# Patient Record
Sex: FEMALE | Race: BLACK OR AFRICAN AMERICAN | Employment: FULL TIME | URBAN - NONMETROPOLITAN AREA
[De-identification: names, ages, dates, MRNs, and addresses within clinical notes are randomized per-mention and may not be internally consistent; named-entity substitution may affect disease eponyms.]

---

## 2021-09-07 ENCOUNTER — HOSPITAL ENCOUNTER (EMERGENCY)
Age: 21
Discharge: HOME | End: 2021-09-07
Attending: EMERGENCY MEDICINE | Admitting: HOSPITALIST
Payer: COMMERCIAL

## 2021-09-07 ENCOUNTER — APPOINTMENT (OUTPATIENT)
Dept: GENERAL RADIOLOGY | Age: 21
End: 2021-09-07
Attending: EMERGENCY MEDICINE
Payer: COMMERCIAL

## 2021-09-07 VITALS
RESPIRATION RATE: 20 BRPM | TEMPERATURE: 98.5 F | DIASTOLIC BLOOD PRESSURE: 86 MMHG | HEART RATE: 67 BPM | OXYGEN SATURATION: 98 % | SYSTOLIC BLOOD PRESSURE: 135 MMHG
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
## 2021-09-07 DIAGNOSIS — S80.02XA CONTUSION OF LEFT KNEE, INITIAL ENCOUNTER: ICD-10-CM

## 2021-09-07 DIAGNOSIS — S20.219A CONTUSION OF CHEST WALL, UNSPECIFIED LATERALITY, INITIAL ENCOUNTER: Primary | ICD-10-CM

## 2021-09-07 PROBLEM — J81.1 PULMONARY EDEMA: Status: ACTIVE | Noted: 2021-09-07

## 2021-09-07 PROCEDURE — 99283 EMERGENCY DEPT VISIT LOW MDM: CPT

## 2021-09-07 PROCEDURE — 71045 X-RAY EXAM CHEST 1 VIEW: CPT

## 2021-09-07 PROCEDURE — 73560 X-RAY EXAM OF KNEE 1 OR 2: CPT

## 2021-09-07 PROCEDURE — 99218 HC RM OBSERVATION: CPT

## 2021-09-07 PROCEDURE — 74011250637 HC RX REV CODE- 250/637: Performed by: EMERGENCY MEDICINE

## 2021-09-07 RX ORDER — SODIUM CHLORIDE 0.9 % (FLUSH) 0.9 %
5-40 SYRINGE (ML) INJECTION AS NEEDED
Status: DISCONTINUED | OUTPATIENT
Start: 2021-09-07 | End: 2021-09-07

## 2021-09-07 RX ORDER — ACETAMINOPHEN 325 MG/1
650 TABLET ORAL
Status: DISCONTINUED | OUTPATIENT
Start: 2021-09-07 | End: 2021-09-07

## 2021-09-07 RX ORDER — ACETAMINOPHEN 650 MG/1
650 SUPPOSITORY RECTAL
Status: DISCONTINUED | OUTPATIENT
Start: 2021-09-07 | End: 2021-09-07

## 2021-09-07 RX ORDER — POLYETHYLENE GLYCOL 3350 17 G/17G
17 POWDER, FOR SOLUTION ORAL DAILY PRN
Status: DISCONTINUED | OUTPATIENT
Start: 2021-09-07 | End: 2021-09-07

## 2021-09-07 RX ORDER — SODIUM CHLORIDE 0.9 % (FLUSH) 0.9 %
5-40 SYRINGE (ML) INJECTION EVERY 8 HOURS
Status: DISCONTINUED | OUTPATIENT
Start: 2021-09-07 | End: 2021-09-07

## 2021-09-07 RX ORDER — ENOXAPARIN SODIUM 100 MG/ML
40 INJECTION SUBCUTANEOUS DAILY
Status: DISCONTINUED | OUTPATIENT
Start: 2021-09-08 | End: 2021-09-07

## 2021-09-07 RX ORDER — IBUPROFEN 600 MG/1
600 TABLET ORAL
Qty: 20 TABLET | Refills: 0 | Status: SHIPPED | OUTPATIENT
Start: 2021-09-07 | End: 2021-09-08 | Stop reason: SDUPTHER

## 2021-09-07 RX ORDER — HYDROCODONE BITARTRATE AND ACETAMINOPHEN 7.5; 325 MG/1; MG/1
1 TABLET ORAL ONCE
Status: COMPLETED | OUTPATIENT
Start: 2021-09-07 | End: 2021-09-07

## 2021-09-07 RX ORDER — ONDANSETRON 2 MG/ML
4 INJECTION INTRAMUSCULAR; INTRAVENOUS
Status: DISCONTINUED | OUTPATIENT
Start: 2021-09-07 | End: 2021-09-07

## 2021-09-07 RX ORDER — ONDANSETRON 4 MG/1
4 TABLET, ORALLY DISINTEGRATING ORAL
Status: DISCONTINUED | OUTPATIENT
Start: 2021-09-07 | End: 2021-09-07

## 2021-09-07 RX ADMIN — HYDROCODONE BITARTRATE AND ACETAMINOPHEN 1 TABLET: 7.5; 325 TABLET ORAL at 19:06

## 2021-09-07 NOTE — Clinical Note
Patient Class[de-identified] OBSERVATION [104]  Type of Bed: Remote Telemetry [29]  Cardiac Monitoring Required?: Yes  Reason for Observation: renal dialysis  Admitting Diagnosis: Pulmonary edema [026327]  Admitting Physician: Alberto Farr [[id number]]  Meraryin g Physician: Alberto Farr [[id number]]
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
## 2021-09-07 NOTE — ED NOTES
Pt was given and educated on discharge instructions, understanding was verbalized. Pt was taken out of ED in wheelchair by staff to an awaiting vehicle.

## 2021-09-07 NOTE — LETTER
[address]  [facility] EMERGENCY DEPT  [address] [id number]  [phone]    Work/School Note    Date: 9/7/2021    To Whom It May concern:    Milan Champion was seen and treated today in the emergency room by the following provider(s):  Attending Provider: Malick Osman MD.      Milan Champion is excused from work/school on 09/07/21 and 09/08/21. She is medically clear to return to work/school on 9/9/2021.        Sincerely,          Jamaal Crawford MD

## 2021-09-08 RX ORDER — IBUPROFEN 600 MG/1
600 TABLET ORAL
Qty: 20 TABLET | Refills: 0 | Status: SHIPPED | OUTPATIENT
Start: 2021-09-08

## 2021-09-09 ENCOUNTER — APPOINTMENT (OUTPATIENT)
Dept: GENERAL RADIOLOGY | Age: 21
End: 2021-09-09
Attending: EMERGENCY MEDICINE
Payer: COMMERCIAL

## 2021-09-09 ENCOUNTER — HOSPITAL ENCOUNTER (EMERGENCY)
Age: 21
Discharge: HOME | End: 2021-09-09
Attending: EMERGENCY MEDICINE
Payer: COMMERCIAL

## 2021-09-09 VITALS
SYSTOLIC BLOOD PRESSURE: 149 MMHG | RESPIRATION RATE: 18 BRPM | DIASTOLIC BLOOD PRESSURE: 91 MMHG | HEART RATE: 75 BPM | TEMPERATURE: 98.9 F | HEIGHT: 66 IN | OXYGEN SATURATION: 99 % | BODY MASS INDEX: 36.16 KG/M2 | WEIGHT: 225 LBS

## 2021-09-09 DIAGNOSIS — S92.422A CLOSED DISPLACED FRACTURE OF DISTAL PHALANX OF LEFT GREAT TOE, INITIAL ENCOUNTER: Primary | ICD-10-CM

## 2021-09-09 PROCEDURE — 73660 X-RAY EXAM OF TOE(S): CPT

## 2021-09-09 PROCEDURE — 99282 EMERGENCY DEPT VISIT SF MDM: CPT

## 2021-09-09 NOTE — ED NOTES
9/9/2021      RE: Isabella Champion      To Whom it May Concern: This is to certify that Evert Ann may return to work on 9/10/2021. Please feel free to contact my office if you have any questions or concerns. Thank you for your assistance in this matter.

## 2021-09-09 NOTE — ED NOTES
Pt given discharge and follow up instructions. Pt given education on pain management. Pt advised to follow up with Ortho. No further questions at this time.
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
## 2021-09-15 ENCOUNTER — OFFICE VISIT (OUTPATIENT)
Dept: ORTHOPEDIC SURGERY | Age: 21
End: 2021-09-15
Payer: COMMERCIAL

## 2021-09-15 VITALS — HEIGHT: 67 IN | BODY MASS INDEX: 40.02 KG/M2 | WEIGHT: 255 LBS

## 2021-09-15 DIAGNOSIS — S92.425A CLOSED NONDISPLACED FRACTURE OF DISTAL PHALANX OF LEFT GREAT TOE, INITIAL ENCOUNTER: ICD-10-CM

## 2021-09-15 DIAGNOSIS — M79.672 LEFT FOOT PAIN: Primary | ICD-10-CM

## 2021-09-15 PROCEDURE — 28490 TREAT BIG TOE FRACTURE: CPT | Performed by: ORTHOPAEDIC SURGERY

## 2021-09-15 PROCEDURE — 99203 OFFICE O/P NEW LOW 30 MIN: CPT | Performed by: ORTHOPAEDIC SURGERY

## 2021-09-15 NOTE — LETTER
9/17/2021    Patient: Markie Morrison   YOB: 2000   Date of Visit: 9/15/2021     Max Flower MD  [address] [facility] [address]  Via Fax: [phone]    Dear Max Flower MD,      Thank you for referring Ms. Issa Champion to [address] [place] [address] for evaluation. My notes for this consultation are attached. If you have questions, please do not hesitate to call me. I look forward to following your patient along with you.       Sincerely,    Norma Suarez MD

## 2021-09-15 NOTE — PROGRESS NOTES
Name: Sana Hansen    : 2000     Service Dept: [address] [place] [address] [facility]    Patient's Pharmacies:    [address] [facility], [address], [place]  [place] [address]  Phone: [phone] Fax: [phone] [address] [address]  Phone: [phone] Fax: [phone]       Chief Complaint   Patient presents with    Toe Pain        Visit Vitals  Ht 5' 7\" (1.702 m)   Wt 255 lb (115.7 kg)   BMI 39.94 kg/m²      No Known Allergies   Current Outpatient Medications   Medication Sig Dispense Refill    ibuprofen (MOTRIN) 600 mg tablet Take 1 Tablet by mouth every six (6) hours as needed for Pain. 20 Tablet 0      Patient Active Problem List   Diagnosis Code    Pulmonary edema J81.1      History reviewed. No pertinent family history. Social History     Socioeconomic History    Marital status: SINGLE     Spouse name: Not on file    Number of children: Not on file    Years of education: Not on file    Highest education level: Not on file   Tobacco Use    Smoking status: Never Smoker    Smokeless tobacco: Never Used   Substance and Sexual Activity    Alcohol use: Yes     Comment: socially     Social Determinants of Health     Financial Resource Strain:     Difficulty of Paying Living Expenses:    Food Insecurity:     Worried About Running Out of Food in the Last Year:     [address] in the Last Year:    Transportation Needs:     Lack of Transportation (Medical):      Lack of Transportation (Non-Medical):    Physical Activity:     Days of Exercise per Week:     Minutes of Exercise per Session:    Stress:     Feeling of Stress :    Social Connections:     Frequency of Communication with Friends and Family:     Frequency of Social Gatherings with Friends and Family:     Attends Sikh Services:     Active Member of Clubs or Organizations:     Attends Club or Organization Meetings:     Marital Status:       History reviewed. No pertinent surgical history. History reviewed. No pertinent past medical history. I have reviewed and agree with [address] and ROS and intake form in chart and the record furthermore I have reviewed prior medical record(s) regarding this patients care during this appointment. Review of Systems:   Patient is a pleasant appearing individual, appropriately dressed, well hydrated, well nourished, who is alert, appropriately oriented for age, and in no acute distress with a normal gait and normal affect who does not appear to be in any significant pain. Physical Exam:  Left foot - grossly neurovascularly intact, good cap refill, positive tenderness to palpation in the area of the fracture(s) , positive soft tissue swelling, decreased range of motion, skin intact. Right foot- Grossly neurovascularly intact, good cap refill, full range of motion, no weakness, no swelling, no point tenderness, no skin lesions. Encounter Diagnoses     ICD-10-CM ICD-9-CM   1. Left foot pain  M79.672 729.5   2. Closed nondisplaced fracture of distal phalanx of left great toe, initial encounter  S92.425A 826.0       HPI:  The patient is here with a chief complaint of left toe pain, dull, throbbing pain. It has been the same. Pain is 3/10. X-rays of the left toe are positive for distal phalanx fracture of the toe, in good alignment on AP, lateral and oblique. Assessment/Plan:  Plan at this point, my recommendation would be for Cam boot, readjustment, fracture care treatment, and we will see the patient back in about 4 weeks to repeat x-rays of the left toe AP, lateral and oblique and go from there. As part of continued conservative pain management options the patient was advised to utilize Tylenol or OTC NSAIDS as long as it is not medically contraindicated. Return to Office:    Follow-up and Dispositions · Return in about 4 weeks (around 10/13/2021) for w/ xrays. Scribed by Pam Mendoza LPN as dictated by [facility] DIONNE Prater MD.  Documentation True and Accepted Jeff Prater MD

## 2021-09-15 NOTE — PATIENT INSTRUCTIONS
Foot Pain: Care Instructions  Your Care Instructions     Foot injuries that cause pain and swelling are fairly common. Almost all sports or home repair projects can cause a misstep that ends up as foot pain. Normal wear and tear, especially as you get older, also can cause foot pain. Most minor foot injuries will heal on their own, and home treatment is usually all you need to do. If you have a severe injury, you may need tests and treatment. Follow-up care is a key part of your treatment and safety. Be sure to make and go to all appointments, and call your doctor if you are having problems. It's also a good idea to know your test results and keep a list of the medicines you take. How can you care for yourself at home? · Take pain medicines exactly as directed. ? If the doctor gave you a prescription medicine for pain, take it as prescribed. ? If you are not taking a prescription pain medicine, ask your doctor if you can take an over-the-counter medicine. · Rest and protect your foot. Take a break from any activity that may cause pain. · Put ice or a cold pack on your foot for 10 to 20 minutes at a time. Put a thin cloth between the ice and your skin. · Prop up the sore foot on a pillow when you ice it or anytime you sit or lie down during the next 3 days. Try to keep it above the level of your heart. This will help reduce swelling. · Your doctor may recommend that you wrap your foot with an elastic bandage. Keep your foot wrapped for as long as your doctor advises. · If your doctor recommends crutches, use them as directed. · Wear roomy footwear. · As soon as pain and swelling end, begin gentle exercises of your foot. Your doctor can tell you which exercises will help. When should you call for help? Call 911 anytime you think you may need emergency care. For example, call if:    · Your foot turns pale, white, blue, or cold.    Call your doctor now or seek immediate medical care if:    · You cannot move or stand on your foot.     · Your foot looks twisted or out of its normal position.     · Your foot is not stable when you step down.     · You have signs of infection, such as:  ? Increased pain, swelling, warmth, or redness. ? Red streaks leading from the sore area. ? Pus draining from a place on your foot. ? A fever.     · Your foot is numb or tingly. Watch closely for changes in your health, and be sure to contact your doctor if:    · You do not get better as expected.     · You have bruises from an injury that last longer than 2 weeks. Where can you learn more? Go to http://www.perez.com/  Enter D999 in the search box to learn more about \"Foot Pain: Care Instructions. \"  Current as of: July 1, 2021               Content Version: 13.0  © 7753-1439 [employer]. Care instructions adapted under license by [employer] (which disclaims liability or warranty for this information). If you have questions about a medical condition or this instruction, always ask your healthcare professional. [address] any warranty or liability for your use of this information.

## 2021-10-11 DIAGNOSIS — M79.675 GREAT TOE PAIN, LEFT: Primary | ICD-10-CM

## 2021-10-13 ENCOUNTER — OFFICE VISIT (OUTPATIENT)
Dept: ORTHOPEDIC SURGERY | Age: 21
End: 2021-10-13
Payer: COMMERCIAL

## 2021-10-13 DIAGNOSIS — M25.475 EFFUSION OF LEFT FOOT: Primary | ICD-10-CM

## 2021-10-13 PROCEDURE — 99024 POSTOP FOLLOW-UP VISIT: CPT | Performed by: ORTHOPAEDIC SURGERY

## 2021-10-13 NOTE — LETTER
10/14/2021    Patient: Dontrell Plaza   YOB: 2000   Date of Visit: 10/13/2021     Tyrone Oseguera MD  [address] [facility] [address]  Via Fax: [phone]    Dear Tyrone Oseguera MD,      Thank you for referring Ms. Duy Champion to [address] [place] [address] for evaluation. My notes for this consultation are attached. If you have questions, please do not hesitate to call me. I look forward to following your patient along with you.       Sincerely,    Mary Lombardi MD

## 2021-10-13 NOTE — PROGRESS NOTES
Name: Ovidio Camarena    : 2000     Service Dept: [address] [place] [address] [place] [facility]    Patient's Pharmacies:    [id number] - [place], [address], [place]  Chiki Mcneill [place] [address]  Phone: [phone] Fax: [phone] [address] [address]  Phone: [phone] Fax: [phone]       Chief Complaint   Patient presents with    Foot Pain        There were no vitals taken for this visit. No Known Allergies   Current Outpatient Medications   Medication Sig Dispense Refill    ibuprofen (MOTRIN) 600 mg tablet Take 1 Tablet by mouth every six (6) hours as needed for Pain. 20 Tablet 0      Patient Active Problem List   Diagnosis Code    Pulmonary edema J81.1      History reviewed. No pertinent family history. Social History     Socioeconomic History    Marital status: SINGLE     Spouse name: Not on file    Number of children: Not on file    Years of education: Not on file    Highest education level: Not on file   Tobacco Use    Smoking status: Never Smoker    Smokeless tobacco: Never Used   Substance and Sexual Activity    Alcohol use: Yes     Comment: socially     Social Determinants of Health     Financial Resource Strain:     Difficulty of Paying Living Expenses:    Food Insecurity:     Worried About Running Out of Food in the Last Year:     [address] in the Last Year:    Transportation Needs:     Lack of Transportation (Medical):      Lack of Transportation (Non-Medical):    Physical Activity:     Days of Exercise per Week:     Minutes of Exercise per Session:    Stress:     Feeling of Stress :    Social Connections:     Frequency of Communication with Friends and Family:     Frequency of Social Gatherings with Friends and Family:     Attends M[employer] Services:     Active Member of Clubs or Organizations:     Attends Club or Organization Meetings:     Marital Status:       History reviewed. No pertinent surgical history. History reviewed. No pertinent past medical history. I have reviewed and agree with [address] and ROS and intake form in chart and the record furthermore I have reviewed prior medical record(s) regarding this patients care during this appointment. Review of Systems:   Patient is a pleasant appearing individual, appropriately dressed, well hydrated, well nourished, who is alert, appropriately oriented for age, and in no acute distress with a normal gait and normal affect who does not appear to be in any significant pain. Physical Exam:  Left foot- Grossly neurovascularly intact, good cap refill, full range of motion, no weakness, no swelling, no point tenderness, no skin lesions. Right foot - Grossly neurovascularly intact, good cap refill, full range of motion, no weakness, no swelling, no point tenderness, no skin lesions. Encounter Diagnoses     ICD-10-CM ICD-9-CM   1. Effusion of left foot  M25.475 719.07       HPI:  The patient is here with a chief complaint of left toe pain. Diagnosed with distal phalanx fracture. X-rays revealed good alignment of the fracture with no evidence of any displacement. Assessment/Plan:  Plan at this point, activities as tolerated, weightbearing started, no restrictions. We will see the patient back as needed. As part of continued conservative pain management options the patient was advised to utilize Tylenol or OTC NSAIDS as long as it is not medically contraindicated. Return to Office: Follow-up and Dispositions    · Return if symptoms worsen or fail to improve. Scribed by Lynne Rios LPN as dictated by [facility] [place] DIONNE Maxwell MD.  Documentation True and Accepted Jeff Maxwell MD

## 2021-10-13 NOTE — PATIENT INSTRUCTIONS
Foot Pain: Care Instructions  Your Care Instructions     Foot injuries that cause pain and swelling are fairly common. Almost all sports or home repair projects can cause a misstep that ends up as foot pain. Normal wear and tear, especially as you get older, also can cause foot pain. Most minor foot injuries will heal on their own, and home treatment is usually all you need to do. If you have a severe injury, you may need tests and treatment. Follow-up care is a key part of your treatment and safety. Be sure to make and go to all appointments, and call your doctor if you are having problems. It's also a good idea to know your test results and keep a list of the medicines you take. How can you care for yourself at home? · Take pain medicines exactly as directed. ? If the doctor gave you a prescription medicine for pain, take it as prescribed. ? If you are not taking a prescription pain medicine, ask your doctor if you can take an over-the-counter medicine. · Rest and protect your foot. Take a break from any activity that may cause pain. · Put ice or a cold pack on your foot for 10 to 20 minutes at a time. Put a thin cloth between the ice and your skin. · Prop up the sore foot on a pillow when you ice it or anytime you sit or lie down during the next 3 days. Try to keep it above the level of your heart. This will help reduce swelling. · Your doctor may recommend that you wrap your foot with an elastic bandage. Keep your foot wrapped for as long as your doctor advises. · If your doctor recommends crutches, use them as directed. · Wear roomy footwear. · As soon as pain and swelling end, begin gentle exercises of your foot. Your doctor can tell you which exercises will help. When should you call for help? Call 911 anytime you think you may need emergency care. For example, call if:    · Your foot turns pale, white, blue, or cold.    Call your doctor now or seek immediate medical care if:    · You cannot move or stand on your foot.     · Your foot looks twisted or out of its normal position.     · Your foot is not stable when you step down.     · You have signs of infection, such as:  ? Increased pain, swelling, warmth, or redness. ? Red streaks leading from the sore area. ? Pus draining from a place on your foot. ? A fever.     · Your foot is numb or tingly. Watch closely for changes in your health, and be sure to contact your doctor if:    · You do not get better as expected.     · You have bruises from an injury that last longer than 2 weeks. Where can you learn more? Go to http://www.perez.com/  Enter D999 in the search box to learn more about \"Foot Pain: Care Instructions. \"  Current as of: July 1, 2021               Content Version: 13.0  © 7214-9063 [employer], [employer]. Care instructions adapted under license by [employer] (which disclaims liability or warranty for this information). If you have questions about a medical condition or this instruction, always ask your healthcare professional. [address] any warranty or liability for your use of this information.

## 2022-02-06 ENCOUNTER — HOSPITAL ENCOUNTER (EMERGENCY)
Age: 22
Discharge: HOME | End: 2022-02-06
Attending: EMERGENCY MEDICINE
Payer: COMMERCIAL

## 2022-02-06 VITALS
BODY MASS INDEX: 36.41 KG/M2 | RESPIRATION RATE: 18 BRPM | HEART RATE: 64 BPM | OXYGEN SATURATION: 99 % | HEIGHT: 67 IN | TEMPERATURE: 98.5 F | DIASTOLIC BLOOD PRESSURE: 86 MMHG | SYSTOLIC BLOOD PRESSURE: 152 MMHG | WEIGHT: 232 LBS

## 2022-02-06 DIAGNOSIS — A05.9 FOOD POISONING: Primary | ICD-10-CM

## 2022-02-06 PROCEDURE — 74011250637 HC RX REV CODE- 250/637: Performed by: EMERGENCY MEDICINE

## 2022-02-06 PROCEDURE — 99283 EMERGENCY DEPT VISIT LOW MDM: CPT

## 2022-02-06 RX ORDER — MAG HYDROX/ALUMINUM HYD/SIMETH 200-200-20
30 SUSPENSION, ORAL (FINAL DOSE FORM) ORAL
Status: DISCONTINUED | OUTPATIENT
Start: 2022-02-06 | End: 2022-02-06 | Stop reason: HOSPADM

## 2022-02-06 RX ADMIN — MAGNESIUM HYDROXIDE/ALUMINUM HYDROXICE/SIMETHICONE 30 ML: 120; 1200; 1200 SUSPENSION ORAL at 15:59

## 2022-02-06 NOTE — Clinical Note
[address] [place]  [facility] EMERGENCY DEPT  [address] [id number]  [phone]    Work/School Note    Date: 2/6/2022    To Whom It May concern:      Matteo Champion was seen and treated today in the emergency room by the following provider(s):  Attending Provider: Simon Leon MD.      Matteo Champion is excused from work/school on 02/06/22. She is clear to return to work/school on 02/07/22.         Sincerely,          Norris Wilson MD

## 2022-02-06 NOTE — ED PROVIDER NOTES
EMERGENCY DEPARTMENT HISTORY AND PHYSICAL EXAM      Date: 2/6/2022  Patient Name: Zelda Champion    History of Presenting Illness     Chief Complaint   Patient presents with    Vomiting       History Provided By: Patient    HPI: Spike Dennis, 24 y.o. female with a past medical history significant No significant past medical history presents to the ED with cc of 3 episodes of vomiting and one episode of diarrhea earlier this morning, patient's initial vomitus included a cheeseburger which she had ingested the night before, patient without any complaints present    There are no other complaints, changes, or physical findings at this time. PCP: Judi Garcia MD    No current facility-administered medications on file prior to encounter. Current Outpatient Medications on File Prior to Encounter   Medication Sig Dispense Refill    ibuprofen (MOTRIN) 600 mg tablet Take 1 Tablet by mouth every six (6) hours as needed for Pain. 20 Tablet 0       Past History     Past Medical History:  No past medical history on file. Past Surgical History:  No past surgical history on file. Family History:  No family history on file. Social History:  Social History     Tobacco Use    Smoking status: Never Smoker    Smokeless tobacco: Never Used   Substance Use Topics    Alcohol use: Yes     Comment: socially    Drug use: Not on file       Allergies:  No Known Allergies      Review of Systems     Review of Systems   Constitutional: Negative for chills and fever. HENT: Negative for rhinorrhea and sore throat. Eyes: Negative for pain and visual disturbance. Respiratory: Negative for cough and shortness of breath. Cardiovascular: Negative for chest pain and leg swelling. Gastrointestinal: Positive for diarrhea and vomiting. Negative for abdominal pain. Endocrine: Negative for polydipsia and polyuria. Genitourinary: Negative for dysuria and hematuria. Musculoskeletal: Negative for back pain and neck pain. Skin: Negative for color change and pallor. Neurological: Negative for weakness and headaches. Psychiatric/Behavioral: Negative for agitation and suicidal ideas. Physical Exam     Physical Exam  Vitals and nursing note reviewed. Constitutional:       General: She is not in acute distress. Appearance: She is not ill-appearing, toxic-appearing or diaphoretic. HENT:      Head: Normocephalic and atraumatic. Right Ear: Tympanic membrane normal.      Left Ear: Tympanic membrane normal.      Nose: Nose normal. No congestion. Mouth/Throat:      Mouth: Mucous membranes are moist.      Pharynx: Oropharynx is clear. Eyes:      Extraocular Movements: Extraocular movements intact. Conjunctiva/sclera: Conjunctivae normal.      Pupils: Pupils are equal, round, and reactive to light. Cardiovascular:      Rate and Rhythm: Normal rate and regular rhythm. Pulses: Normal pulses. Heart sounds: Normal heart sounds. Pulmonary:      Effort: Pulmonary effort is normal.      Breath sounds: Normal breath sounds. Abdominal:      General: Bowel sounds are normal.      Palpations: Abdomen is soft. Tenderness: There is no abdominal tenderness. Musculoskeletal:         General: No tenderness, deformity or signs of injury. Normal range of motion. Cervical back: Normal range of motion and neck supple. No rigidity or tenderness. Lymphadenopathy:      Cervical: No cervical adenopathy. Skin:     General: Skin is warm and dry. Capillary Refill: Capillary refill takes less than 2 seconds. Findings: No rash. Neurological:      General: No focal deficit present. Mental Status: She is alert and oriented to person, place, and time. Cranial Nerves: No cranial nerve deficit. Sensory: No sensory deficit.    Psychiatric:         Mood and Affect: Mood normal.         Behavior: Behavior normal.         Lab and Diagnostic Study Results     Labs -   No results found for this or any previous visit (from the past 12 hour(s)). Radiologic Studies -   @lastxrresult@  CT Results  (Last 48 hours)    None        CXR Results  (Last 48 hours)    None            Medical Decision Making   - I am the first provider for this patient. - I reviewed the vital signs, available nursing notes, past medical history, past surgical history, family history and social history. - Initial assessment performed. The patients presenting problems have been discussed, and they are in agreement with the care plan formulated and outlined with them. I have encouraged them to ask questions as they arise throughout their visit. Vital Signs-Reviewed the patient's vital signs. Patient Vitals for the past 12 hrs:   Temp Pulse Resp BP SpO2   02/06/22 1518 98.5 °F (36.9 °C) 64 18 (!) 152/86 99 %       Records Reviewed: Nursing Notes    The patient presents with abdominal pain with a differential diagnosis of abdominal pain, diverticulitis, gastritis and gastroenteritis      ED Course:          Provider Notes (Medical Decision Making): MDM       Procedures   Medical Decision Makingedical Decision Making  Performed by: Kristi Paul MD  PROCEDURES:  Procedures       Disposition   Disposition: Condition stable and improved  DC- Adult Discharges: All of the diagnostic tests were reviewed and questions answered. Diagnosis, care plan and treatment options were discussed. The patient understands the instructions and will follow up as directed. The patients results have been reviewed with them. They have been counseled regarding their diagnosis. The patient verbally convey understanding and agreement of the signs, symptoms, diagnosis, treatment and prognosis and additionally agrees to follow up as recommended with their PCP in 24 - 48 hours. They also agree with the care-plan and convey that all of their questions have been answered.   I have also put together some discharge instructions for them that include: 1) educational information regarding their diagnosis, 2) how to care for their diagnosis at home, as well a 3) list of reasons why they would want to return to the ED prior to their follow-up appointment, should their condition change. DISCHARGE PLAN:  1. Current Discharge Medication List      CONTINUE these medications which have NOT CHANGED    Details   ibuprofen (MOTRIN) 600 mg tablet Take 1 Tablet by mouth every six (6) hours as needed for Pain. Qty: 20 Tablet, Refills: 0           2. Follow-up Information    None       3. Return to ED if worse   4. Current Discharge Medication List            Diagnosis     Clinical Impression: No diagnosis found. Attestations:    Anjana Jimenez MD    Please note that this dictation was completed with [employer], the computer voice recognition software. Quite often unanticipated grammatical, syntax, homophones, and other interpretive errors are inadvertently transcribed by the computer software. Please disregard these errors. Please excuse any errors that have escaped final proofreading. Thank you.

## 2022-03-18 PROBLEM — J81.1 PULMONARY EDEMA: Status: ACTIVE | Noted: 2021-09-07

## 2023-09-23 ENCOUNTER — HOSPITAL ENCOUNTER (EMERGENCY)
Facility: HOSPITAL | Age: 23
Discharge: HOME | End: 2023-09-23
Attending: EMERGENCY MEDICINE
Payer: MEDICAID

## 2023-09-23 VITALS
BODY MASS INDEX: 27.47 KG/M2 | DIASTOLIC BLOOD PRESSURE: 89 MMHG | HEIGHT: 67 IN | SYSTOLIC BLOOD PRESSURE: 135 MMHG | TEMPERATURE: 99.8 F | WEIGHT: 175 LBS | RESPIRATION RATE: 18 BRPM | OXYGEN SATURATION: 96 % | HEART RATE: 100 BPM

## 2023-09-23 DIAGNOSIS — U07.1 COVID: Primary | ICD-10-CM

## 2023-09-23 LAB
FLUAV RNA SPEC QL NAA+PROBE: NOT DETECTED
FLUBV RNA SPEC QL NAA+PROBE: NOT DETECTED
SARS-COV-2 RNA RESP QL NAA+PROBE: DETECTED

## 2023-09-23 PROCEDURE — 99283 EMERGENCY DEPT VISIT LOW MDM: CPT

## 2023-09-23 PROCEDURE — 87636 SARSCOV2 & INF A&B AMP PRB: CPT

## 2023-09-23 ASSESSMENT — ENCOUNTER SYMPTOMS
ALLERGIC/IMMUNOLOGIC NEGATIVE: 1
EYES NEGATIVE: 1
RESPIRATORY NEGATIVE: 1
GASTROINTESTINAL NEGATIVE: 1

## 2023-09-23 ASSESSMENT — LIFESTYLE VARIABLES
HOW MANY STANDARD DRINKS CONTAINING ALCOHOL DO YOU HAVE ON A TYPICAL DAY: PATIENT DOES NOT DRINK
HOW OFTEN DO YOU HAVE A DRINK CONTAINING ALCOHOL: NEVER

## 2023-09-23 ASSESSMENT — PAIN DESCRIPTION - PAIN TYPE: TYPE: ACUTE PAIN

## 2023-09-23 ASSESSMENT — PAIN - FUNCTIONAL ASSESSMENT: PAIN_FUNCTIONAL_ASSESSMENT: 0-10

## 2023-09-23 ASSESSMENT — PAIN SCALES - GENERAL: PAINLEVEL_OUTOF10: 6

## 2023-09-23 NOTE — ED PROVIDER NOTES
[facility] EMERGENCY DEPT  EMERGENCY DEPARTMENT ENCOUNTER      Pt Name: Srinivas Morris  MRN: [id number]  [address] [facility] 2000  Date of evaluation: 9/23/2023  Provider: Estela Ovalles MD    [address] [facility]       Chief Complaint   Patient presents with    Generalized Body Aches    Nasal Congestion                HISTORY OF PRESENT ILLNESS   (Location/Symptom, Timing/Onset, Context/Setting, Quality, Duration, Modifying Factors, Severity)  Note limiting factors. Srinivas Morris is a 24 y.o. female who presents to the emergency department with report of nasal congestion and body aches after being exposed to Covid at work. [facility]    Nursing Notes were reviewed. REVIEW OF SYSTEMS    (2-9 systems for level 4, 10 or more for level 5)     Review of Systems   Constitutional: Negative. Negative for chills and fever. HENT:  Positive for congestion. Eyes: Negative. Respiratory: Negative. Cardiovascular: Negative. Gastrointestinal: Negative. Endocrine: Negative. Genitourinary: Negative. Musculoskeletal:  Positive for arthralgias. Allergic/Immunologic: Negative. Neurological: Negative. Hematological: Negative. Psychiatric/Behavioral: Negative. Except as noted above the remainder of the review of systems was reviewed and negative. PAST MEDICAL HISTORY   History reviewed. No pertinent past medical history. SURGICAL HISTORY     History reviewed. No pertinent surgical history. CURRENT MEDICATIONS       Previous Medications    No medications on file       ALLERGIES     Patient has no known allergies. FAMILY HISTORY     No family history on file.        SOCIAL HISTORY       Social History     Socioeconomic History    Marital status: Single     Spouse name: None    Number of children: None    Years of education: None    Highest education level: None   Tobacco Use    Smoking status: Never    Smokeless tobacco: Never   Substance and Sexual Activity    Alcohol use: Yes       SCREENINGS

## 2023-09-23 NOTE — ED TRIAGE NOTES
Pt presents to ED for c/o congestion, scratchy throat, and body aches. States a coworker just tested pos for COVID.

## 2024-03-06 ENCOUNTER — HOSPITAL ENCOUNTER (EMERGENCY)
Facility: HOSPITAL | Age: 24
Discharge: HOME | End: 2024-03-06
Attending: EMERGENCY MEDICINE
Payer: MEDICAID

## 2024-03-06 VITALS
BODY MASS INDEX: 27.78 KG/M2 | RESPIRATION RATE: 18 BRPM | TEMPERATURE: 98.1 F | DIASTOLIC BLOOD PRESSURE: 91 MMHG | WEIGHT: 177 LBS | HEIGHT: 67 IN | OXYGEN SATURATION: 100 % | SYSTOLIC BLOOD PRESSURE: 151 MMHG | HEART RATE: 69 BPM

## 2024-03-06 DIAGNOSIS — A08.4 VIRAL GASTROENTERITIS: Primary | ICD-10-CM

## 2024-03-06 LAB
APPEARANCE UR: CLEAR
BACTERIA URNS QL MICRO: ABNORMAL /HPF
BILIRUB UR QL: NEGATIVE
COLOR UR: ABNORMAL
EPITH CASTS URNS QL MICRO: ABNORMAL /LPF
FLUAV RNA SPEC QL NAA+PROBE: NOT DETECTED
FLUBV RNA SPEC QL NAA+PROBE: NOT DETECTED
GLUCOSE UR STRIP.AUTO-MCNC: NEGATIVE MG/DL
HCG UR QL: NEGATIVE
HGB UR QL STRIP: NEGATIVE
KETONES UR QL STRIP.AUTO: NEGATIVE MG/DL
LEUKOCYTE ESTERASE UR QL STRIP.AUTO: NEGATIVE
NITRITE UR QL STRIP.AUTO: NEGATIVE
PH UR STRIP: 6.5 (ref 5–8)
PROT UR STRIP-MCNC: NEGATIVE MG/DL
RBC #/AREA URNS HPF: ABNORMAL /HPF (ref 0–3)
SARS-COV-2 RNA RESP QL NAA+PROBE: NOT DETECTED
SP GR UR REFRACTOMETRY: 1.02 (ref 1–1.03)
UROBILINOGEN UR QL STRIP.AUTO: 0.2 EU/DL (ref 0.2–1)
WBC URNS QL MICRO: ABNORMAL /HPF (ref 0–5)

## 2024-03-06 PROCEDURE — 81025 URINE PREGNANCY TEST: CPT

## 2024-03-06 PROCEDURE — 87636 SARSCOV2 & INF A&B AMP PRB: CPT

## 2024-03-06 PROCEDURE — 6370000000 HC RX 637 (ALT 250 FOR IP): Performed by: EMERGENCY MEDICINE

## 2024-03-06 PROCEDURE — 81001 URINALYSIS AUTO W/SCOPE: CPT

## 2024-03-06 PROCEDURE — 99283 EMERGENCY DEPT VISIT LOW MDM: CPT

## 2024-03-06 RX ORDER — ONDANSETRON 4 MG/1
4 TABLET, ORALLY DISINTEGRATING ORAL EVERY 8 HOURS PRN
Qty: 20 TABLET | Refills: 0 | Status: SHIPPED | OUTPATIENT
Start: 2024-03-06 | End: 2024-03-13

## 2024-03-06 RX ORDER — DICYCLOMINE HYDROCHLORIDE 10 MG/1
10 CAPSULE ORAL EVERY 6 HOURS PRN
Qty: 20 CAPSULE | Refills: 0 | Status: SHIPPED | OUTPATIENT
Start: 2024-03-06 | End: 2024-03-11

## 2024-03-06 RX ORDER — LOPERAMIDE HYDROCHLORIDE 2 MG/1
2 CAPSULE ORAL 4 TIMES DAILY PRN
Qty: 20 CAPSULE | Refills: 0 | Status: SHIPPED | OUTPATIENT
Start: 2024-03-06 | End: 2024-03-16

## 2024-03-06 RX ORDER — ONDANSETRON 4 MG/1
4 TABLET, ORALLY DISINTEGRATING ORAL ONCE
Status: COMPLETED | OUTPATIENT
Start: 2024-03-06 | End: 2024-03-06

## 2024-03-06 RX ADMIN — ONDANSETRON 4 MG: 4 TABLET, ORALLY DISINTEGRATING ORAL at 16:28

## 2024-03-06 ASSESSMENT — PAIN SCALES - GENERAL: PAINLEVEL_OUTOF10: 0

## 2024-03-06 NOTE — ED TRIAGE NOTES
Patient reports she thinks she has food poisoning has been vomiting and having diarrhea since waking this morning with 2 episodes of vomiting and multiple episodes of diarrhea

## 2024-03-06 NOTE — ED PROVIDER NOTES
MD Americo (electronically signed)    (Please note that parts of this dictation were completed with voice recognition software. Quite often unanticipated grammatical, syntax, homophones, and other interpretive errors are inadvertently transcribed by the computer software. Please disregards these errors. Please excuse any errors that have escaped final proofreading.)      Robin Driscoll MD  03/06/24 8314

## 2024-04-14 ENCOUNTER — HOSPITAL ENCOUNTER (EMERGENCY)
Facility: HOSPITAL | Age: 24
Discharge: HOME | End: 2024-04-14
Attending: STUDENT IN AN ORGANIZED HEALTH CARE EDUCATION/TRAINING PROGRAM
Payer: MEDICAID

## 2024-04-14 VITALS
WEIGHT: 181.5 LBS | OXYGEN SATURATION: 100 % | BODY MASS INDEX: 28.49 KG/M2 | HEART RATE: 64 BPM | SYSTOLIC BLOOD PRESSURE: 144 MMHG | RESPIRATION RATE: 18 BRPM | HEIGHT: 67 IN | TEMPERATURE: 97.7 F | DIASTOLIC BLOOD PRESSURE: 96 MMHG

## 2024-04-14 DIAGNOSIS — K52.9 GASTROENTERITIS: Primary | ICD-10-CM

## 2024-04-14 LAB
APPEARANCE UR: CLEAR
BACTERIA URNS QL MICRO: NEGATIVE /HPF
BILIRUB UR QL: NEGATIVE
COLOR UR: NORMAL
GLUCOSE UR STRIP.AUTO-MCNC: NEGATIVE MG/DL
HCG UR QL: NEGATIVE
HGB UR QL STRIP: NEGATIVE
KETONES UR QL STRIP.AUTO: NEGATIVE MG/DL
LEUKOCYTE ESTERASE UR QL STRIP.AUTO: NEGATIVE
NITRITE UR QL STRIP.AUTO: NEGATIVE
PH UR STRIP: 6.5 (ref 5–8)
PROT UR STRIP-MCNC: NEGATIVE MG/DL
RBC #/AREA URNS HPF: NORMAL /HPF (ref 0–3)
SP GR UR REFRACTOMETRY: 1.02 (ref 1–1.03)
URINE CULTURE IF INDICATED: NORMAL
UROBILINOGEN UR QL STRIP.AUTO: 0.2 EU/DL (ref 0.2–1)
WBC URNS QL MICRO: NORMAL /HPF (ref 0–5)

## 2024-04-14 PROCEDURE — 6370000000 HC RX 637 (ALT 250 FOR IP): Performed by: STUDENT IN AN ORGANIZED HEALTH CARE EDUCATION/TRAINING PROGRAM

## 2024-04-14 PROCEDURE — 99283 EMERGENCY DEPT VISIT LOW MDM: CPT

## 2024-04-14 PROCEDURE — 81025 URINE PREGNANCY TEST: CPT

## 2024-04-14 PROCEDURE — 81001 URINALYSIS AUTO W/SCOPE: CPT

## 2024-04-14 RX ORDER — FAMOTIDINE 20 MG/1
20 TABLET, FILM COATED ORAL 2 TIMES DAILY
Qty: 14 TABLET | Refills: 0 | Status: SHIPPED | OUTPATIENT
Start: 2024-04-14 | End: 2024-04-14

## 2024-04-14 RX ORDER — ONDANSETRON 4 MG/1
4 TABLET, ORALLY DISINTEGRATING ORAL EVERY 8 HOURS PRN
Qty: 20 TABLET | Refills: 0 | Status: SHIPPED | OUTPATIENT
Start: 2024-04-14 | End: 2024-04-14

## 2024-04-14 RX ORDER — FAMOTIDINE 20 MG/1
20 TABLET, FILM COATED ORAL 2 TIMES DAILY
Qty: 14 TABLET | Refills: 0 | Status: SHIPPED | OUTPATIENT
Start: 2024-04-14 | End: 2024-04-21

## 2024-04-14 RX ORDER — MAGNESIUM HYDROXIDE/ALUMINUM HYDROXICE/SIMETHICONE 120; 1200; 1200 MG/30ML; MG/30ML; MG/30ML
30 SUSPENSION ORAL ONCE
Status: COMPLETED | OUTPATIENT
Start: 2024-04-14 | End: 2024-04-14

## 2024-04-14 RX ORDER — ONDANSETRON 4 MG/1
TABLET, ORALLY DISINTEGRATING ORAL
COMMUNITY
Start: 2024-04-11

## 2024-04-14 RX ORDER — ONDANSETRON 4 MG/1
4 TABLET, ORALLY DISINTEGRATING ORAL ONCE
Status: COMPLETED | OUTPATIENT
Start: 2024-04-14 | End: 2024-04-14

## 2024-04-14 RX ORDER — FAMOTIDINE 20 MG/1
20 TABLET, FILM COATED ORAL ONCE
Status: COMPLETED | OUTPATIENT
Start: 2024-04-14 | End: 2024-04-14

## 2024-04-14 RX ORDER — ONDANSETRON 4 MG/1
4 TABLET, ORALLY DISINTEGRATING ORAL EVERY 8 HOURS PRN
Qty: 20 TABLET | Refills: 0 | Status: SHIPPED | OUTPATIENT
Start: 2024-04-14

## 2024-04-14 RX ORDER — LIDOCAINE HYDROCHLORIDE 20 MG/ML
15 SOLUTION OROPHARYNGEAL
Status: COMPLETED | OUTPATIENT
Start: 2024-04-14 | End: 2024-04-14

## 2024-04-14 RX ADMIN — LIDOCAINE HYDROCHLORIDE 15 ML: 20 SOLUTION ORAL at 02:45

## 2024-04-14 RX ADMIN — ONDANSETRON 4 MG: 4 TABLET, ORALLY DISINTEGRATING ORAL at 02:46

## 2024-04-14 RX ADMIN — ALUMINUM HYDROXIDE, MAGNESIUM HYDROXIDE, AND SIMETHICONE 30 ML: 1200; 120; 1200 SUSPENSION ORAL at 02:45

## 2024-04-14 RX ADMIN — FAMOTIDINE 20 MG: 20 TABLET ORAL at 02:45

## 2024-04-14 ASSESSMENT — PAIN SCALES - GENERAL: PAINLEVEL_OUTOF10: 0

## 2024-04-14 ASSESSMENT — LIFESTYLE VARIABLES
HOW OFTEN DO YOU HAVE A DRINK CONTAINING ALCOHOL: NEVER
HOW MANY STANDARD DRINKS CONTAINING ALCOHOL DO YOU HAVE ON A TYPICAL DAY: PATIENT DOES NOT DRINK

## 2024-04-14 NOTE — DISCHARGE INSTRUCTIONS
Thank you!    Thank you for allowing me to care for you in the emergency department.  I sincerely hope that you are satisfied with your visit today.  It is my goal to provide you with excellent care.    Below you will find a list of your labs and imaging from your visit today if applicable. Should you have any questions regarding these results please do not hesitate to call the emergency department. Please review [employer] for a more detailed result list since the below list may not be comprehensive. Instructions on how to sign up to [employer] should be provided in this packet.    Labs -  Recent Results (from the past 12 hour(s))   Urinalysis with Reflex to Culture    Collection Time: 04/14/24  2:35 AM    Specimen: Urine   Result Value Ref Range    Color, UA Yellow/Straw      Appearance Clear Clear      Specific Gravity, UA 1.020 1.003 - 1.030      pH, Urine 6.5 5.0 - 8.0      Protein, UA Negative Negative mg/dL    Glucose, UA Negative Negative mg/dL    Ketones, Urine Negative Negative mg/dL    Bilirubin Urine Negative Negative      Blood, Urine Negative Negative      Urobilinogen, Urine 0.2 0.2 - 1.0 EU/dL    Nitrite, Urine Negative Negative      Leukocyte Esterase, Urine Negative Negative      WBC, UA 0-4 0 - 5 /hpf    RBC, UA 0-5 0 - 3 /hpf    BACTERIA, URINE Negative Negative /hpf    Urine Culture if Indicated Culture not indicated by UA result Culture not indicated by UA result     Pregnancy, Urine    Collection Time: 04/14/24  2:35 AM   Result Value Ref Range    HCG(Urine) Pregnancy Test Negative Negative         Radiologic Studies -   No orders to display          If you feel that you have not received excellent quality care or timely care, please ask to speak to the nurse manager. Please choose us in the future for your continued health care needs.   ------------------------------------------------------------------------------------------------------------  The exam and treatment you received in the Emergency

## 2024-04-14 NOTE — ED PROVIDER NOTES
them with you.                   Where to Get Your Medications        These medications were sent to [id number] - [place], [place] - [address] [facility] [phone] - F [phone] [address] [facility] [address]      Phone: [phone]   famotidine 20 MG tablet  loperamide 1 MG/5ML solution  ondansetron 4 MG disintegrating tablet  Tylenol 325 MG Caps         Procedures, Critical Care, & Clinical Tools   Performed by: Mathieu Perkins MD  Procedures     Not Applicable     Results, Consults, Medications     Consults:  None   Labs:  Recent Results (from the past 12 hour(s))   Urinalysis with Reflex to Culture    Collection Time: 04/14/24  2:35 AM    Specimen: Urine   Result Value Ref Range    Color, UA Yellow/Straw      Appearance Clear Clear      Specific Gravity, UA 1.020 1.003 - 1.030      pH, Urine 6.5 5.0 - 8.0      Protein, UA Negative Negative mg/dL    Glucose, UA Negative Negative mg/dL    Ketones, Urine Negative Negative mg/dL    Bilirubin Urine Negative Negative      Blood, Urine Negative Negative      Urobilinogen, Urine 0.2 0.2 - 1.0 EU/dL    Nitrite, Urine Negative Negative      Leukocyte Esterase, Urine Negative Negative      WBC, UA 0-4 0 - 5 /hpf    RBC, UA 0-5 0 - 3 /hpf    BACTERIA, URINE Negative Negative /hpf    Urine Culture if Indicated Culture not indicated by UA result Culture not indicated by UA result     Pregnancy, Urine    Collection Time: 04/14/24  2:35 AM   Result Value Ref Range    HCG(Urine) Pregnancy Test Negative Negative       Radiologic Studies:  No orders to display     Medications ordered:  Medications   aluminum & magnesium hydroxide-simethicone (MAALOX) 200-200-20 MG/5ML suspension 30 mL (30 mLs Oral Given 4/14/24 0245)   lidocaine viscous hcl (XYLOCAINE) 2 % solution 15 mL (15 mLs Mouth/Throat Given 4/14/24 0245)   famotidine (PEPCID) tablet 20 mg (20 mg Oral Given 4/14/24 0245)   ondansetron (ZOFRAN-ODT) disintegrating tablet 4 mg (4 mg Oral Given 4/14/24 0246)

## 2024-04-14 NOTE — ED TRIAGE NOTES
Pt states that she might have a case of food poisoning. She had an episode of emesis about two hours PTA and some diarrhea. Pt states that she had the same a few days ago and went to an urgent care in which they told her she might have a case of food poisoning but the symptoms returned.